# Patient Record
Sex: MALE | Race: BLACK OR AFRICAN AMERICAN | NOT HISPANIC OR LATINO | URBAN - METROPOLITAN AREA
[De-identification: names, ages, dates, MRNs, and addresses within clinical notes are randomized per-mention and may not be internally consistent; named-entity substitution may affect disease eponyms.]

---

## 2024-05-10 ENCOUNTER — APPOINTMENT (OUTPATIENT)
Dept: RHEUMATOLOGY | Facility: CLINIC | Age: 34
End: 2024-05-10

## 2024-06-14 ENCOUNTER — APPOINTMENT (OUTPATIENT)
Dept: RHEUMATOLOGY | Facility: CLINIC | Age: 34
End: 2024-06-14
Payer: COMMERCIAL

## 2024-06-17 ENCOUNTER — OFFICE VISIT (OUTPATIENT)
Dept: RHEUMATOLOGY | Facility: CLINIC | Age: 34
End: 2024-06-17
Payer: COMMERCIAL

## 2024-06-17 VITALS
DIASTOLIC BLOOD PRESSURE: 81 MMHG | TEMPERATURE: 97.3 F | WEIGHT: 183 LBS | RESPIRATION RATE: 20 BRPM | HEIGHT: 69 IN | SYSTOLIC BLOOD PRESSURE: 129 MMHG | OXYGEN SATURATION: 97 % | HEART RATE: 94 BPM | BODY MASS INDEX: 27.11 KG/M2

## 2024-06-17 DIAGNOSIS — M32.9 LUPUS (MULTI): Primary | ICD-10-CM

## 2024-06-17 PROCEDURE — 99214 OFFICE O/P EST MOD 30 MIN: CPT | Performed by: INTERNAL MEDICINE

## 2024-06-17 RX ORDER — ERGOCALCIFEROL 1.25 MG/1
1.25 CAPSULE ORAL WEEKLY
COMMUNITY
Start: 2015-06-12

## 2024-06-17 RX ORDER — IBUPROFEN 800 MG/1
800 TABLET ORAL EVERY 6 HOURS PRN
COMMUNITY
Start: 2022-11-19

## 2024-06-17 RX ORDER — PREDNISONE 10 MG/1
10 TABLET ORAL DAILY PRN
Qty: 90 TABLET | Refills: 1 | Status: SHIPPED | OUTPATIENT
Start: 2024-06-17 | End: 2025-06-17

## 2024-06-17 RX ORDER — MODAFINIL 100 MG/1
50 TABLET ORAL
COMMUNITY
Start: 2022-11-19

## 2024-06-17 RX ORDER — HYDROXYCHLOROQUINE SULFATE 200 MG/1
200 TABLET, FILM COATED ORAL DAILY
COMMUNITY
Start: 2022-11-19 | End: 2024-06-17 | Stop reason: SDUPTHER

## 2024-06-17 RX ORDER — GLYCOPYRROLATE 1 MG/1
1 TABLET ORAL
COMMUNITY
Start: 2024-06-04

## 2024-06-17 RX ORDER — NORTRIPTYLINE HYDROCHLORIDE 50 MG/1
50 CAPSULE ORAL
COMMUNITY
Start: 2022-09-16

## 2024-06-17 RX ORDER — ALBUTEROL SULFATE 90 UG/1
2 AEROSOL, METERED RESPIRATORY (INHALATION) EVERY 6 HOURS PRN
COMMUNITY
Start: 2023-04-19

## 2024-06-17 RX ORDER — HYDROXYCHLOROQUINE SULFATE 200 MG/1
200 TABLET, FILM COATED ORAL DAILY
Qty: 90 TABLET | Refills: 3 | Status: SHIPPED | OUTPATIENT
Start: 2024-06-17 | End: 2025-06-17

## 2024-06-17 RX ORDER — PREDNISONE 10 MG/1
1 TABLET ORAL DAILY PRN
COMMUNITY
End: 2024-06-17 | Stop reason: SDUPTHER

## 2024-06-17 ASSESSMENT — PAIN SCALES - GENERAL: PAINLEVEL: 0-NO PAIN

## 2024-06-17 NOTE — PROGRESS NOTES
PP: 33 year-old black male with history of tension headaches.  CC: Presents for evaluation status post remote pericardial effusion and elevated NISSA.  [place]: He had presented to the [facility] emergency department and April 2015 after having an episode of chest pain on 2 consecutive days. He was found to have elevated CPK in the 1000 range as well as pericardial effusion. Subsequent evaluations demonstrated elevated NISSA. He was treated with non steroidal anti-inflammatory medications and with colchicine. Subsequent echocardiograms demonstrated reduction in the pericardial effusion and muscle enzymes had decreased to 543. The colchicine was discontinued in June 2015. He has been having fatigue and headaches since 2010. He was evaluated by MRI scan of the brain as recommended by the neurologist and subsequently placed on nortriptyline 50 mg daily in 3/2015. Since taking the nortriptyline he has noted the headache and fatigue had improved significantly. He had recurrent mouth sores in the past but otherwise has not noted any dysphagia, joint swelling, rashes, photosensitivity, Raynaud's symptoms, or joint pain. He has not had any recurrence of the chest pain. He takes prednisone 10 mg tablet once per day only as needed for exacerbation of musculoskeletal symptoms.  He has noted some slight chest discomfort that resolves spontaneously.  PH: Allergies: No known drug allergies; Illnesses: Tension headaches; Surgeries: None.  SH: He is employed as a [employer] where he currently resides. He denies any tobacco or illicit drug use. Alcohol he drinks 1-2 drinks per week.  FH: Father, 2 brothers, and 2 sisters are healthy. His mother has fibromyalgia and lupus. He has no children.     He has a well-developed, well-nourished, young-appearing black female. The head, lungs, heart, abdomen, and extremities are benign. There is a [place] tattoo on the lateral aspect of the right upper arm. The musculoskeletal  examination does not show any synovitis.     Laboratory (6/10/2015): WBC 3.9, hemoglobin 12.5, hematocrit 42.6, MCV 73, MCHC 29.3, platelets 289, creatinine 0.86, glucose 68, BUN 7, calcium 9.3, albumin 4.3, alkaline phosphatase 103, AST 51, ALT 70, total bilirubin 0.4, uric acid 5.2, rheumatoid factor <10, ESR 7, CRP <0.3, C3 134, C4 35, CPK 1149, NISSA 1:1280, potassium/RNP >8.0, chromatin 5.8, RNP >8.0.     He is a 33 year-old male with systemic lupus erythematosus with past history of pericarditis, ST segment elevation myocarditis and cognitive dysfunction that improved with taking modafinil.    He is to continue Hydroxychloroquine 200 mg daily and Prednisone 10 mg daily as needed for exacerbation of lupus and symptoms.  He is to have laboratory for CRP, C3, C4, CBC, comprehensive metabolic panel, spot urine protein/creatinine ratio, urinalysis.  He is to return at the next available office appointment in 6 months.

## 2024-12-02 ENCOUNTER — APPOINTMENT (OUTPATIENT)
Dept: RHEUMATOLOGY | Facility: CLINIC | Age: 34
End: 2024-12-02
Payer: COMMERCIAL